# Patient Record
Sex: MALE | Race: WHITE | NOT HISPANIC OR LATINO | Employment: FULL TIME | URBAN - METROPOLITAN AREA
[De-identification: names, ages, dates, MRNs, and addresses within clinical notes are randomized per-mention and may not be internally consistent; named-entity substitution may affect disease eponyms.]

---

## 2021-12-19 ENCOUNTER — APPOINTMENT (OUTPATIENT)
Dept: GENERAL RADIOLOGY | Facility: CLINIC | Age: 31
End: 2021-12-19
Attending: EMERGENCY MEDICINE
Payer: COMMERCIAL

## 2021-12-19 ENCOUNTER — HOSPITAL ENCOUNTER (EMERGENCY)
Facility: CLINIC | Age: 31
Discharge: HOME | End: 2021-12-20
Attending: EMERGENCY MEDICINE | Admitting: EMERGENCY MEDICINE
Payer: COMMERCIAL

## 2021-12-19 VITALS
TEMPERATURE: 98.1 F | HEART RATE: 109 BPM | SYSTOLIC BLOOD PRESSURE: 121 MMHG | DIASTOLIC BLOOD PRESSURE: 85 MMHG | OXYGEN SATURATION: 95 % | RESPIRATION RATE: 20 BRPM

## 2021-12-19 DIAGNOSIS — S86.011A RUPTURE OF ACHILLES TENDON, RIGHT, INITIAL ENCOUNTER: ICD-10-CM

## 2021-12-19 PROCEDURE — 73610 X-RAY EXAM OF ANKLE: CPT | Mod: RT

## 2021-12-19 PROCEDURE — 29515 APPLICATION SHORT LEG SPLINT: CPT | Mod: RT

## 2021-12-19 PROCEDURE — 99284 EMERGENCY DEPT VISIT MOD MDM: CPT | Mod: 25

## 2021-12-20 PROCEDURE — 250N000013 HC RX MED GY IP 250 OP 250 PS 637: Performed by: EMERGENCY MEDICINE

## 2021-12-20 RX ORDER — IBUPROFEN 600 MG/1
600 TABLET, FILM COATED ORAL ONCE
Status: COMPLETED | OUTPATIENT
Start: 2021-12-20 | End: 2021-12-20

## 2021-12-20 RX ADMIN — IBUPROFEN 600 MG: 600 TABLET, FILM COATED ORAL at 00:26

## 2021-12-20 ASSESSMENT — ENCOUNTER SYMPTOMS
CONSTITUTIONAL NEGATIVE: 1
NUMBNESS: 0

## 2021-12-20 NOTE — ED PROVIDER NOTES
History     Chief Complaint:  Ankle Pain       HPI   Dayo Georges is a 31 year old male who presents with posterior ankle pain after injury while playing soccer.  He notes while playing soccer he notes he was playing soccer and he planted his foot at which point he heard a sudden pop and fell to the ground and pain.  He notes the pain is posterior and advances up into his calf.  He denies any bulging or masses.  Denies any numbness or tingling.  He denies any significant swelling.  He denies any other injuries.    ROS:  Review of Systems   Constitutional: Negative.    Musculoskeletal:        Posterior ankle and calf pain on the left   Skin: Negative.    Neurological: Negative for numbness.        Allergies:  No Known Allergies     Medications:    No daily medications    Past Medical History:    No chronic medical problems     Past Surgical History:    No previous ankle surgery    Social History:  Here with a friend  PCP: No Ref-Primary, Physician     Physical Exam     Patient Vitals for the past 24 hrs:   BP Temp Temp src Pulse Resp SpO2   12/19/21 2331 121/85 98.1  F (36.7  C) Oral 109 20 95 %        Physical Exam  General: Adult male sitting upright  CV: 2+ DP and PT pulses right foot and ankle  MSK: Tender over the posterior aspect of the right ankle with possible palpable defect at the Achilles tendon.  Mild tenderness of the right distal posterior calf.   No calf swelling or increased firmness to palpation. No bony tenderness palpation of the right ankle or foot.  Negative calf squeeze test on the right.   Skin: Warm and dry. No rashes or lesions or ecchymoses on visible skin.  Neuro: Alert and oriented. Responds appropriately to all questions and commands. No focal findings appreciated. Normal muscle tone.  Sensation intact to light touch over the right foot and toe webs.  Psych: Normal mood and affect. Pleasant.    Emergency Department Course     Procedure   Splint Placement    PLACEMENT: A posterior and  stirrup short leg custom Orthoglass splint was applied after appropriate padding to the right lower extremity and after placement I checked and adjusted the fit to ensure proper positioning. The patient was more comfortable with the splint in place. Sensation and circulation are intact after splint placement.       Imaging:  Ankle XR, G/E 3 views, right   Final Result   IMPRESSION: Normal joint spaces and alignment. No fracture.         Report per radiology    Emergency Department Course:    Reviewed:  I reviewed nursing notes, vitals and past medical history    Assessments:   I obtained history and examined the patient as noted above.    I placed a posterior Ortho-Glass splint splint as per procedure.    Interventions:  Medications   ibuprofen (ADVIL/MOTRIN) tablet 600 mg (600 mg Oral Given 12/20/21 0026)        Disposition:  The patient was discharged to home.     Impression & Plan        Covid-19  Dayo Georges was evaluated during a global COVID-19 pandemic, which necessitated consideration that the patient might be at risk for infection with the SARS-CoV-2 virus that causes COVID-19.   Applicable protocols for evaluation were followed during the patient's care.     Medical Decision Making:  Dayo Georges is a 30 y/o male healthy at baseline who presents emergency department with concerns for right posterior ankle pain and calf pain after injury sustained while playing soccer.  Mechanism and physical exam are consistent with probable partial or full rupture of the right Achilles tendon.  He is neurovascular intact.  He is placed in a splint as per the procedure note.  Patient understands importance of follow-up with orthopedics for further definitive care.  Given instruction on crutch use and sent home with crutches.  Tylenol and ibuprofen as needed for pain.  Ice, elevation, rest.  Call orthopedics tomorrow for follow-up.  He felt comfortable this plan.  All questions were answered prior to  discharge    Diagnosis:    ICD-10-CM    1. Rupture of Achilles tendon, right, initial encounter  S86.011A            12/20/2021   Harriet Stephens MD Jonkman, Tracy Dianne, MD  12/20/21 0028

## 2021-12-20 NOTE — ED TRIAGE NOTES
Pt states right ankle pain after planting foot to run forward while playing soccer tonight. CMS intact GCS 15